# Patient Record
(demographics unavailable — no encounter records)

---

## 2024-10-07 NOTE — CONSULT LETTER
[Dear  ___] : Dear  [unfilled], [Courtesy Letter:] : I had the pleasure of seeing your patient, [unfilled], in my office today. [Sincerely,] : Sincerely, [DrJacob  ___] : Dr. SAGE

## 2024-10-08 NOTE — PAST MEDICAL HISTORY
[Menarche Age ____] : age at menarche was [unfilled] [Total Preg ___] : G[unfilled] [Live Births ___] : P[unfilled]  [Age At Live Birth ___] : Age at live birth: [unfilled] [FreeTextEntry7] : x 6 months [FreeTextEntry8] : 1 x 3 months

## 2024-10-08 NOTE — PHYSICAL EXAM
[No dominant masses] : no dominant masses in right breast  [EOMI] : extra ocular movement intact [Sclera nonicteric] : sclera nonicteric [Supple] : supple [No Supraclavicular Adenopathy] : no supraclavicular adenopathy [No Cervical Adenopathy] : no cervical adenopathy [No Thyromegaly] : no thyromegaly [Clear to Auscultation Bilat] : clear to auscultation bilaterally [Examined in the supine and seated position] : examined in the supine and seated position [No dominant masses] : no dominant masses left breast [No Nipple Retraction] : no left nipple retraction [No Nipple Discharge] : no left nipple discharge [No Axillary Lymphadenopathy] : no left axillary lymphadenopathy [Soft] : abdomen soft [Not Tender] : non-tender [de-identified] : Radial scar 9:00. Inframammary scar. Elliptical skin island inferior. [de-identified] : Low transverse abdominal scar. [de-identified] : Inframammary scar. several benign appearing skin lesion lateral left (keratoses, angioma).

## 2024-10-08 NOTE — HISTORY OF PRESENT ILLNESS
[FreeTextEntry1] : This is a 64 year old  female diagnosed with left breast DCIS in 1/2012.  She underwent bilateral nipple sparing mastectomies with implant reconstructions in 3/2012.  She had had pre-existing implants and did not require alloderm. She had the implants exchanged in June 2015. She did not have fat injections.  She underwent conversion to a EMILIA reconstruction in 9/2020.  She had to go back to the OR for a concern on the left doppler signal.  She then had an abdominal E.Coli wound infection requiring hospitalization and a wound vac.  She had a right leg DVT.  She then had a smaller MRSA infection at an abdominal wound suture site and took Bactrim for 20 days.  She decided not to have revision surgery, but did undergo steroid injections for the scars which did lead to improvement.   She was seen in March 2024 complaining of blisters at the right breast incision line.  She saw her Dermatologist and cultures were negative, including for shingles. A skin biopsy was performed in 4/2024 and showed a subepidermal blister with eosinophils. Her Dermatologist diagnosed it as bullous pemphigoid.  She has not had any other skin outbreaks, but has had some sores in her mouth.  She has no current complaints except for some slight itchiness of the lateral left breast skin.

## 2024-10-22 NOTE — ASSESSMENT
[FreeTextEntry1] :    I have reviewed the patient's medical records and diagnostic images at time of this office consultation and have made the following recommendation: 1.

## 2024-10-22 NOTE — HISTORY OF PRESENT ILLNESS
[FreeTextEntry1] : Ms. BREANN MARTÍNEZ, 64 year old female, former smoker, w/ hx of left breast DCIS in 01/2012, s/p bilateral nipple sparing mastectomies with implant reconstructions in 3/2012, implants exchanged in June 2015, s/p conversion to a EMILIA reconstruction in 9/2020, c/w abdominal E.Coli wound infection requiring hospitalization and a wound vac, right DVT, MRSA infection at an abdominal wound suture site, anxiety, Morrissey esophagus?, s/p umbilical hernia repair, HTN, ....., who referred by Dr. Francine Calvin (GI) for hiatal hernia.   EGD on 04/12/2024 for epigastric abdominal pain, reflux and Nausea. Nodular mucosa in the gastric fundus and in the gastric body. Nodularity and mossaic pattern mucosa in the gastric fundus. Small hiatal hernia.   Patient is here today for CT surgery consultation.

## 2024-10-22 NOTE — CONSULT LETTER
[Dear  ___] : Dear  [unfilled], [Consult Letter:] : I had the pleasure of evaluating your patient, [unfilled]. [Please see my note below.] : Please see my note below. [Consult Closing:] : Thank you very much for allowing me to participate in the care of this patient.  If you have any questions, please do not hesitate to contact me. [Sincerely,] : Sincerely, [FreeTextEntry2] : Dr. Francine Calvin (GI/Ref) [FreeTextEntry3] : Tee Banks MD, MPH  System Director of Thoracic Surgery  Director of Comprehensive Lung and Foregut [place]  Professor Cardiovascular & Thoracic Surgery  [facility] [employer] at [facility]

## 2024-10-31 NOTE — PHYSICAL EXAM
[General Appearance - Alert] : alert [General Appearance - In No Acute Distress] : in no acute distress [Sclera] : the sclera and conjunctiva were normal [PERRL With Normal Accommodation] : pupils were equal in size, round, and reactive to light [Extraocular Movements] : extraocular movements were intact [Outer Ear] : the ears and nose were normal in appearance [Oropharynx] : the oropharynx was normal [Neck Appearance] : the appearance of the neck was normal [Neck Cervical Mass (___cm)] : no neck mass was observed [Jugular Venous Distention Increased] : there was no jugular-venous distention [Thyroid Diffuse Enlargement] : the thyroid was not enlarged [Thyroid Nodule] : there were no palpable thyroid nodules [Auscultation Breath Sounds / Voice Sounds] : lungs were clear to auscultation bilaterally [Heart Rate And Rhythm] : heart rate was normal and rhythm regular [Heart Sounds] : normal S1 and S2 [Heart Sounds Gallop] : no gallops [Murmurs] : no murmurs [Heart Sounds Pericardial Friction Rub] : no pericardial rub [Examination Of The Chest] : the chest was normal in appearance [Chest Visual Inspection Thoracic Asymmetry] : no chest asymmetry [Diminished Respiratory Excursion] : normal chest expansion [2+] : left 2+ [Bowel Sounds] : normal bowel sounds [Abdomen Soft] : soft [Abdomen Tenderness] : non-tender [Abdomen Mass (___ Cm)] : no abdominal mass palpated [Cervical Lymph Nodes Enlarged Posterior Bilaterally] : posterior cervical [Cervical Lymph Nodes Enlarged Anterior Bilaterally] : anterior cervical [Supraclavicular Lymph Nodes Enlarged Bilaterally] : supraclavicular [No CVA Tenderness] : no ~M costovertebral angle tenderness [No Spinal Tenderness] : no spinal tenderness [Abnormal Walk] : normal gait [Nail Clubbing] : no clubbing  or cyanosis of the fingernails [Musculoskeletal - Swelling] : no joint swelling seen [Motor Tone] : muscle strength and tone were normal [Skin Color & Pigmentation] : normal skin color and pigmentation [Skin Turgor] : normal skin turgor [] : no rash

## 2024-11-01 NOTE — CONSULT LETTER
[FreeTextEntry2] : Dr. Francine Calvin (GI/Ref) [FreeTextEntry3] : Tee Banks MD, MPH  System Director of Thoracic Surgery  Director of Comprehensive Lung and Foregut [place]  Professor Cardiovascular & Thoracic Surgery  [facility] [employer] at [facility]

## 2024-11-01 NOTE — DATA REVIEWED
[FreeTextEntry1] : I have independently reviewed patient's EGD on 04/12/2024 and Upper GI series on 10/28/24

## 2024-11-01 NOTE — CONSULT LETTER
[FreeTextEntry2] : Dr. Francine Calvin (GI/Ref) [FreeTextEntry3] : Tee Banks MD, MPH  System Director of Thoracic Surgery  Director of Comprehensive Lung and Foregut [place]  Professor Cardiovascular & Thoracic Surgery  [facility] [employer] at [facility]

## 2024-11-01 NOTE — HISTORY OF PRESENT ILLNESS
[FreeTextEntry1] : Ms. BREANN MARTÍNEZ, 64 year old female, former smoker (1 ppd x 5 yrs, quit in 1981), w/ hx of HTN, HLD, left breast DCIS in 01/2012, s/p bilateral nipple sparing mastectomies with implant reconstructions in 3/2012, implants exchanged in June 2015, s/p conversion to a EMILIA reconstruction in 9/2020, c/w abdominal E. Coli wound infection requiring hospitalization and a wound vac, right DVT, MRSA infection at an abdominal wound suture site, anxiety, s/p umbilical hernia repair, who referred by Dr. Francine Calvin (GI) for hiatal hernia.   EGD on 04/12/2024 for epigastric abdominal pain, reflux and Nausea. Nodular mucosa in the gastric fundus and in the gastric body. Nodularity and mossaic pattern mucosa in the gastric fundus. Small hiatal hernia.   Upper GI series on 10/28/24: -  radiograph of the chest demonstrates the visualized lungs to be clear. Surgical clips are noted overlying the chest. - The patient drank barium without difficulty. There is normal esophageal distensibility and transit time and there is no stricture, mass or diverticulum. - There is small hiatal hernia. Gastroesophageal reflux to the upper esophagus was demonstrated during water siphonage.  Patient is here today for CT surgery consultation. Admits to acid reflux, on/off aspiration but denies dysphagia or regurgitation.

## 2024-11-01 NOTE — CONSULT LETTER
[FreeTextEntry2] : Dr. Francine Calvin (GI/Ref) [FreeTextEntry3] : Tee Banks MD, MPH  System Director of Thoracic Surgery  Director of Comprehensive Lung and Foregut [place]  Professor Cardiovascular & Thoracic Surgery  [facility] [employer] at [facility]

## 2024-11-01 NOTE — ASSESSMENT
[FreeTextEntry1] : Ms. BREANN MARTÍNEZ, 64 year old female, former smoker (1 ppd x 5 yrs, quit in 1981), w/ hx of HTN, HLD, left breast DCIS in 01/2012, s/p bilateral nipple sparing mastectomies with implant reconstructions in 3/2012, implants exchanged in June 2015, s/p conversion to a EMILIA reconstruction in 9/2020, c/w abdominal E. Coli wound infection requiring hospitalization and a wound vac, right DVT, MRSA infection at an abdominal wound suture site, anxiety, s/p umbilical hernia repair, who referred by Dr. Francine Calvin (GI) for hiatal hernia.   I have reviewed the patient's medical records and diagnostic images at time of this office consultation and have made the following recommendation: 1. EGD and upper GI study reviewed, showed hiatal hernia, patient is symptomatic failing medical therapy (using both Pepcid and prilosec with acid reflux still), with aspiration risks, I have discussed surgical intervention with patient. I recommended an EGD, Lap, R.A. repair of hiatal hernia, fundoplication on 1/6/25. Risks and benefits and alternatives explained to patient, all questions answered, patient agreed to proceed with surgery. 2. CT Chest w/o contrast 3. Manometry with Dr. Meeta Page 4. Medical clearance and PST   I, KEYA Sheth, personally performed the evaluation and management (E/M) services for this established patient who presents today with (a) new problem(s)/exacerbation of (an) existing condition(s). That E/M includes conducting the examination, assessing all new/exacerbated conditions, and establishing a new plan of care. Today, my ACP, Isabelle Goodrich NP was here to observe my evaluation and management services for this new problem/exacerbated condition to be followed going forward.

## 2024-12-05 NOTE — HISTORY OF PRESENT ILLNESS
Quality 431: Preventive Care And Screening: Unhealthy Alcohol Use - Screening: Patient screened for unhealthy alcohol use using a single question and scores less than 2 times per year
Detail Level: Detailed
[FreeTextEntry1] : 64 year old female with PMH of HTN, HLD, anxiety, left breast DCIS s/p bilateral mastectomy with implant reconstruction 2012/implant exchange 2015/conversion to EMILIA reconstruction 2020 c/b E.coli wound infection, wound vac, right DVT, and MRSA wound infection referred by Dr. Tee Banks for motility evaluation due to hiatal hernia and GERD. Patient reports a long history of reflux, recently worsening. Describes heartburn, bitter/sour taste, and nausea. Takes prilosec 40mg in the morning, famotidine 40mg in the evening, and tums frequently as needed. States antacids help but she reports breakthrough symptoms. Denies dysphagia, odynophagia, or regurgitation.  Underwent EGD 4/12/2024 with Dr. Francine Calvin which revealed small hiatal hernia, normal appearing esophagus, nodular mucosa in the gastric fundus and body, normal appearing duodenum. Pathology not available for my review.  Repeat EGD 10/2/2024 with Dr. Calvin showed medium sized hiatal hernia, mild chronic gastritis, esophageal mucosa changes suspicious for Morrissey's esophagus but pathology negative for Morrissey's. No intestinal metaplasia, dysplasia, or H. pylori.  Upper GI series on 10/28/24: normal esophageal distensibility and transit time and there is no stricture, mass or diverticulum. There is small hiatal hernia. Gastroesophageal reflux to the upper esophagus was demonstrated during water siphonage.  Family history significant for stomach cancer in patient's mother and maternal aunt. Patient is up to date on her colon cancer screening as she states she underwent colonoscopy with Dr. Francine Calvin in June of 2024.
Quality 130: Documentation Of Current Medications In The Medical Record: Current Medications Documented
Quality 226: Preventive Care And Screening: Tobacco Use: Screening And Cessation Intervention: Patient screened for tobacco use and is an ex/non-smoker

## 2024-12-05 NOTE — REASON FOR VISIT
[Home] : at home, [unfilled] , at the time of the visit. [Medical Office: ([facility])___] : at the medical office located in  [Patient] : the patient [Self] : self

## 2024-12-05 NOTE — ASSESSMENT
[FreeTextEntry1] : 64 year old female with PMH of HTN, HLD, anxiety, left breast DCIS s/p bilateral mastectomy with implant reconstruction 2012/implant exchange 2015/conversion to EMILIA reconstruction 2020 c/b E.coli wound infection, wound vac, right DVT, and MRSA wound infection referred by Dr. Tee Banks for motility evaluation due to hiatal hernia and GERD. Patient with long history of reflux symptoms, now worsening, despite PPI/H2 blocker use. Most recent EGD with medium sized hernia and mild chronic gastritis. Esophagram showed small HH and reflux to the upper esophagus. Reviewed Dr. Tee Banks's note and the available procedure reports Recommend esophageal manometry to assess esophageal motility prior to consideration of surgery.  Discussed Esophageal manometry procedure in detail with patient. The risks, benefits and alternatives of the procedure were explained. Risks includes nasal irritation, bleeding, coughing, sore throat and sinusitis. Patient advised that an escort is not required as no anesthesia is used in this procedure. Patient instructed to remain NPO for at least 8 hours prior to the test. Patient instructed to avoid taking any prokinetic agents, muscle relaxants, opiate pain medications, Valium, Xanax, Ativan,etc. Patient was made aware that the procedure is performed by an NP/PA and interpreted at a later date by the physician. All questions were answered. The patient expressed understanding of these risks and is agreeable to proceed. The  to confirm appointment with patient.

## 2025-02-19 NOTE — ASSESSMENT
[FreeTextEntry1] : Ms. BREANN MARTÍNEZ, 64 year old female, former smoker (1 ppd x 5 yrs, quit in 1981), w/ hx of HTN, HLD, left breast DCIS in 01/2012, s/p bilateral nipple sparing mastectomies with implant reconstructions in 3/2012, implants exchanged in June 2015, s/p conversion to a EMILIA reconstruction in 9/2020, c/w abdominal E. Coli wound infection requiring hospitalization and a wound vac, right DVT, MRSA infection at an abdominal wound suture site, anxiety, s/p umbilical hernia repair, who referred by Dr. Francine Calvin (GI) for hiatal hernia.   EGD on 04/12/2024 for epigastric abdominal pain, reflux and Nausea. Nodular mucosa in the gastric fundus and in the gastric body. Nodularity and mossaic pattern mucosa in the gastric fundus. Small hiatal hernia.  Upper GI series on 10/28/24: -  radiograph of the chest demonstrates the visualized lungs to be clear. Surgical clips are noted overlying the chest. - The patient drank barium without difficulty. There is normal esophageal distensibility and transit time and there is no stricture, mass or diverticulum. - There is small hiatal hernia. Gastroesophageal reflux to the upper esophagus was demonstrated during water siphonage.  CT chest done on 12/09/2024. - Trace pericardial effusion noted - S/P bilateral mastectomies with construction - Elliptical-shaped isodensities are present in paraspinal location bilaterally at mult. levels. Largest noted on Lt & measures 1.4 x 0.6cm.  - Few visualized isodensities on CT scan of abdomen on 9/15/2020 are unchanged  Now s/p ~2 weeks EGD, Laparoscopy RA, hiatal hernia repair, & Toupet fundoplication. gastric fat pad and hernia sac were dissected free, sent to Pathology. Path revealed ____.   CXRAY reviewed.   Patient presents today for follow-up s/p procedure.   I have reviewed the patient's medical records and diagnostic images at time of this office consultation and have made the following recommendation: 1.

## 2025-02-19 NOTE — REASON FOR VISIT
[de-identified] : EGD, Laparoscopy RA, hiatal hernia repair, & Toupet fundoplication [de-identified] : 02/12/2025

## 2025-02-19 NOTE — CONSULT LETTER
[Dear  ___] : Dear  [unfilled], [Consult Letter:] : I had the pleasure of evaluating your patient, [unfilled]. [Please see my note below.] : Please see my note below. [Consult Closing:] : Thank you very much for allowing me to participate in the care of this patient.  If you have any questions, please do not hesitate to contact me. [Sincerely,] : Sincerely, [FreeTextEntry2] : Dr. Francine Calvin (GI/Ref) [FreeTextEntry3] : Tee Banks MD, MPH  System Director of Thoracic Surgery  Director of Comprehensive Lung and Foregut [place]  Professor Cardiovascular & Thoracic Surgery  [facility] [employer] at [facility]

## 2025-02-27 NOTE — CONSULT LETTER
[FreeTextEntry2] : Dr. Francine Calvin (GI/Ref) [FreeTextEntry3] : Tee Banks MD, MPH  System Director of Thoracic Surgery  Director of Comprehensive Lung and Foregut [place]  Professor Cardiovascular & Thoracic Surgery  [facility] [employer] at [facility]

## 2025-02-27 NOTE — ASSESSMENT
[FreeTextEntry1] : Ms. BREANN MARTÍNEZ, 64 year old female, former smoker (1 ppd x 5 yrs, quit in 1981), w/ hx of HTN, HLD, left breast DCIS in 01/2012, s/p bilateral nipple sparing mastectomies with implant reconstructions in 3/2012, implants exchanged in June 2015, s/p conversion to a EMILIA reconstruction in 9/2020, c/w abdominal E. Coli wound infection requiring hospitalization and a wound vac, right DVT, MRSA infection at an abdominal wound suture site, anxiety, s/p umbilical hernia repair, who referred by Dr. Francine Calvin (GI) for hiatal hernia.   EGD on 04/12/2024 for epigastric abdominal pain, reflux and Nausea. Nodular mucosa in the gastric fundus and in the gastric body. Nodularity and mossaic pattern mucosa in the gastric fundus. Small hiatal hernia.  Upper GI series on 10/28/24: -  radiograph of the chest demonstrates the visualized lungs to be clear. Surgical clips are noted overlying the chest. - The patient drank barium without difficulty. There is normal esophageal distensibility and transit time and there is no stricture, mass or diverticulum. - There is small hiatal hernia. Gastroesophageal reflux to the upper esophagus was demonstrated during water siphonage.  CT chest done on 12/09/2024. - Trace pericardial effusion noted - S/P bilateral mastectomies with construction - Elliptical-shaped isodensities are present in paraspinal location bilaterally at mult. levels. Largest noted on Lt & measures 1.4 x 0.6cm.  - Few visualized isodensities on CT scan of abdomen on 9/15/2020 are unchanged  Manometry on 12/10/24: - normal with HH of 3 cm  Now s/p EGD, Laparoscopy RA, hiatal hernia repair, Toupet fundoplication on 2/12/25. Path revealed mature fibroadipose tissue and two lymph nodes with no significant histopathologic findings.  CXR done 2/25/25 revealed no acute disease.  She is recovering well, tolerating soft diet now. Denies fever, chills, N/V/C/D, abd pain, acid reflux.   I have reviewed the patient's medical records and diagnostic images at time of this office consultation and have made the following recommendation: 1. Clinically doing well after surgery. Continue soft diet for 3-4 days, can advance to regular diet afterwards.  2. RTC in 6 months with esophagram.    I, KEYA Sheth, personally performed the evaluation and management (E/M) services for this established patient who follow up today with an existing condition.  That E/M includes conducting the examination, assessing all new/exacerbated/existing conditions, and establishing a plan of care. Today, my ACP, Dianelys Delgado NP, was here to observe my evaluation and management services for this existing condition to be followed going forward.

## 2025-02-27 NOTE — REASON FOR VISIT
Care Management Progress Note:  CM provided family with SNF choice list, family to review and make decision, will update CM in am. Per family they will remain in the [place] area during rehab stay (patient home is in [place], [place]). CM following for needs. [de-identified] : EGD, Laparoscopy RA, hiatal hernia repair, & Toupet fundoplication [de-identified] : 02/12/2025

## 2025-02-27 NOTE — DISCUSSION/SUMMARY
[Doing Well] : is doing well [Excellent Pain Control] : has excellent pain control [No Sign of Infection] : is showing no signs of infection [Dressing Change] : dressing was changed [Clinical Recheck] : clinical recheck

## 2025-03-04 NOTE — REVIEW OF SYSTEMS
[Fever] : no fever [Chills] : no chills [Earache] : no earache [Nasal Discharge] : no nasal discharge [Sore Throat] : no sore throat [Chest Pain] : no chest pain [Palpitations] : no palpitations [Wheezing] : no wheezing [Cough] : no cough [Nausea] : no nausea [Vomiting] : no vomiting [Dysuria] : no dysuria [Headache] : no headache [Dizziness] : no dizziness [FreeTextEntry2] : COVID end of January [FreeTextEntry7] : reflux is better; more regular with linzess [FreeTextEntry8] : has been hydrating [de-identified] : Mood doing well-8-9 hours of sleep per night; melatonin helping

## 2025-03-04 NOTE — HISTORY OF PRESENT ILLNESS
[FreeTextEntry1] : Ms. MARTÍNEZ presents for annual physical. [de-identified] : Ms. MARTÍNEZ presents for annual physical.  Just had Laproscopic Hiatal Hernia Repair-Dr. Tee Banks; just did follow-up.  On Linzess now- was treated for SIBO; started end of December Doing better with Linzess  Drinking Warm beverages Reflux is much better since procedure.   Sees Neurology-Dr. Ellison every 6 months   Sees Breast Surgeon annually  2024 last colonoscopy Saw GYN August 2024-Dr. Rivero  Gastro Dr. MARKEL Calvin-MRI showed fatty liver will be following up.  Derm every 6 months

## 2025-03-04 NOTE — HEALTH RISK ASSESSMENT
[No] : No [Former] : Former [# of Members in Household ___] :  household currently consist of [unfilled] member(s) [Retired] : retired [] :  [# Of Children ___] : has [unfilled] children [de-identified] : Walking 4 days a week about a half hour [de-identified] : stopped in 20s  [MammogramComments] : follow up [PapSmearDate] : 08/24 [ColonoscopyDate] : 2024 [de-identified] : Reading and distance-eyeglasses-eye exam January 2025 [de-identified] : Dentist tomorrow

## 2025-03-04 NOTE — PHYSICAL EXAM
[No Acute Distress] : no acute distress [Well-Appearing] : well-appearing [Normal Sclera/Conjunctiva] : normal sclera/conjunctiva [PERRL] : pupils equal round and reactive to light [Normal Oropharynx] : the oropharynx was normal [Normal TMs] : both tympanic membranes were normal [No Lymphadenopathy] : no lymphadenopathy [Thyroid Normal, No Nodules] : the thyroid was normal and there were no nodules present [No Edema] : there was no peripheral edema [No Extremity Clubbing/Cyanosis] : no extremity clubbing/cyanosis [Normal] : affect was normal and insight and judgment were intact

## 2025-05-28 NOTE — ASSESSMENT
[FreeTextEntry1] : Ms. BREANN MARTÍNEZ, 65 year old female, former smoker (1 ppd x 5 yrs, quit in 1981), w/ hx of HTN, HLD, left breast DCIS in 01/2012, s/p bilateral nipple sparing mastectomies with implant reconstructions in 3/2012, implants exchanged in June 2015, s/p conversion to a EMILIA reconstruction in 9/2020, c/w abdominal E. Coli wound infection requiring hospitalization and a wound vac, right DVT, MRSA infection at an abdominal wound suture site, anxiety, s/p umbilical hernia repair, who referred by Dr. Francine Calvin (GI) for hiatal hernia.  EGD on 04/12/2024 for epigastric abdominal pain, reflux and Nausea. Nodular mucosa in the gastric fundus and in the gastric body. Nodularity and mossaic pattern mucosa in the gastric fundus. Small hiatal hernia.  Now s/p EGD, Laparoscopy RA, hiatal hernia repair, Toupet fundoplication on 2/12/25. Path revealed mature fibroadipose tissue and two lymph nodes with no significant histopathologic findings. She is recovering well, tolerating soft diet now. Denies fever, chills, N/V/C/D, abd pain, acid reflux.  esophagram on 5/29/25:   I have reviewed the patient's medical records and diagnostic images at time of this office consultation and have made the following recommendation: 1.

## 2025-05-28 NOTE — DATA REVIEWED
[FreeTextEntry1] : I have independently reviewed the following: esophagram on 5/29/25 [Normal] : no acute distress, well nourished, well developed and well-appearing [No JVD] : no jugular venous distention [No Respiratory Distress] : no respiratory distress  [Normal Outer Ear/Nose] : the outer ears and nose were normal in appearance [No Accessory Muscle Use] : no accessory muscle use

## 2025-05-28 NOTE — HISTORY OF PRESENT ILLNESS
[FreeTextEntry1] : Ms. BREANN MARTÍNEZ, 65 year old female, former smoker (1 ppd x 5 yrs, quit in 1981), w/ hx of HTN, HLD, left breast DCIS in 01/2012, s/p bilateral nipple sparing mastectomies with implant reconstructions in 3/2012, implants exchanged in June 2015, s/p conversion to a EMILIA reconstruction in 9/2020, c/w abdominal E. Coli wound infection requiring hospitalization and a wound vac, right DVT, MRSA infection at an abdominal wound suture site, anxiety, s/p umbilical hernia repair, who referred by Dr. Francine Calvin (GI) for hiatal hernia.  EGD on 04/12/2024 for epigastric abdominal pain, reflux and Nausea. Nodular mucosa in the gastric fundus and in the gastric body. Nodularity and mossaic pattern mucosa in the gastric fundus. Small hiatal hernia.  Upper GI series on 10/28/24: -  radiograph of the chest demonstrates the visualized lungs to be clear. Surgical clips are noted overlying the chest. - The patient drank barium without difficulty. There is normal esophageal distensibility and transit time and there is no stricture, mass or diverticulum. - There is small hiatal hernia. Gastroesophageal reflux to the upper esophagus was demonstrated during water siphonage.  CT chest done on 12/09/2024. - Trace pericardial effusion noted - S/P bilateral mastectomies with construction - Elliptical-shaped isodensities are present in paraspinal location bilaterally at mult. levels. Largest noted on Lt & measures 1.4 x 0.6cm. - Few visualized isodensities on CT scan of abdomen on 9/15/2020 are unchanged  Manometry on 12/10/24: - normal with HH of 3 cm  Now s/p EGD, Laparoscopy RA, hiatal hernia repair, Toupet fundoplication on 2/12/25. Path revealed mature fibroadipose tissue and two lymph nodes with no significant histopathologic findings.  CXR done 2/25/25 revealed no acute disease. She is recovering well, tolerating soft diet now. Denies fever, chills, N/V/C/D, abd pain, acid reflux.  esophagram on 5/29/25:   Pt presents today for follow up.

## 2025-05-30 NOTE — HISTORY OF PRESENT ILLNESS
[FreeTextEntry1] : Ms. BREANN MARTÍNEZ, 65 year old female, former smoker (1 ppd x 5 yrs, quit in 1981), w/ hx of HTN, HLD, left breast DCIS in 01/2012, s/p bilateral nipple sparing mastectomies with implant reconstructions in 3/2012, implants exchanged in June 2015, s/p conversion to a EMILIA reconstruction in 9/2020, c/w abdominal E. Coli wound infection requiring hospitalization and a wound vac, right DVT, MRSA infection at an abdominal wound suture site, anxiety, s/p umbilical hernia repair, who referred by Dr. Francine Calvin (GI) for hiatal hernia.  EGD on 04/12/2024 for epigastric abdominal pain, reflux and Nausea. Nodular mucosa in the gastric fundus and in the gastric body. Nodularity and mossaic pattern mucosa in the gastric fundus. Small hiatal hernia.  Upper GI series on 10/28/24: -  radiograph of the chest demonstrates the visualized lungs to be clear. Surgical clips are noted overlying the chest. - The patient drank barium without difficulty. There is normal esophageal distensibility and transit time and there is no stricture, mass or diverticulum. - There is small hiatal hernia. Gastroesophageal reflux to the upper esophagus was demonstrated during water siphonage.  CT chest done on 12/09/2024. - Trace pericardial effusion noted - S/P bilateral mastectomies with construction - Elliptical-shaped isodensities are present in paraspinal location bilaterally at mult. levels. Largest noted on Lt & measures 1.4 x 0.6cm. - Few visualized isodensities on CT scan of abdomen on 9/15/2020 are unchanged  Manometry on 12/10/24: - normal with HH of 3 cm  Now 3 mons s/p EGD, Laparoscopy RA, hiatal hernia repair, Toupet fundoplication on 2/12/25. Path revealed mature fibroadipose tissue and two lymph nodes with no significant histopathologic findings.  Pt called, had 4-5 episodes of food stuck, and regurgitation. Pt was feeling chocking. Pt is on 3 times daily with regular diet. Liquid is fine. Pt also c/o acid reflux. Advised pt to do barium swallow test and RTC to see Dr. Banks.   Barium esophagram on 5/29/25:  - mild gastroesophageal reflux was noted. - Fundoplication wrap in good position. - no esophageal stricture noted.  Pt presents today for follow up.

## 2025-05-30 NOTE — CONSULT LETTER
[Dear  ___] : Dear  [unfilled], [Consult Letter:] : I had the pleasure of evaluating your patient, [unfilled]. [Please see my note below.] : Please see my note below. [Consult Closing:] : Thank you very much for allowing me to participate in the care of this patient.  If you have any questions, please do not hesitate to contact me. [Sincerely,] : Sincerely, [FreeTextEntry2] : Dr. Francine Calvin (GI/Ref) [FreeTextEntry3] : Tee Banks MD, MPH  System Director of Thoracic Surgery  Director of Comprehensive Lung and Foregut [place]  Professor Cardiovascular & Thoracic Surgery  [facility] [employer] at [facility]

## 2025-05-30 NOTE — ASSESSMENT
[FreeTextEntry1] : Ms. BREANN MARTÍNEZ, 65 year old female, former smoker (1 ppd x 5 yrs, quit in 1981), w/ hx of HTN, HLD, left breast DCIS in 01/2012, s/p bilateral nipple sparing mastectomies with implant reconstructions in 3/2012, implants exchanged in June 2015, s/p conversion to a EMILIA reconstruction in 9/2020, c/w abdominal E. Coli wound infection requiring hospitalization and a wound vac, right DVT, MRSA infection at an abdominal wound suture site, anxiety, s/p umbilical hernia repair, who referred by Dr. Francine Calvin (GI) for hiatal hernia.  EGD on 04/12/2024 for epigastric abdominal pain, reflux and Nausea. Nodular mucosa in the gastric fundus and in the gastric body. Nodularity and mossaic pattern mucosa in the gastric fundus. Small hiatal hernia.  Now s/p EGD, Laparoscopy RA, hiatal hernia repair, Toupet fundoplication on 2/12/25. Path revealed mature fibroadipose tissue and two lymph nodes with no significant histopathologic findings. She is recovering well, tolerating soft diet now. Denies fever, chills, N/V/C/D, abd pain, acid reflux.  Barium esophagram on 5/29/25:  - mild gastroesophageal reflux was noted. - Fundoplication wrap in good position. - no esophageal stricture noted.  I have reviewed the patient's medical records and diagnostic images at time of this office consultation and have made the following recommendation: 1.

## 2025-07-27 NOTE — HISTORY OF PRESENT ILLNESS
[FreeTextEntry1] : Ms. BREANN MARTÍNEZ, 65 year old female, former smoker (1 ppd x 5 yrs, quit in 1981), w/ hx of HTN, HLD, left breast DCIS in 01/2012, s/p bilateral nipple sparing mastectomies with implant reconstructions in 3/2012, implants exchanged in June 2015, s/p conversion to a EMILIA reconstruction in 9/2020, c/w abdominal E. Coli wound infection requiring hospitalization and a wound vac, right DVT, MRSA infection at an abdominal wound suture site, anxiety, s/p umbilical hernia repair, who referred by Dr. Francine Calvin (GI) for hiatal hernia.  EGD on 04/12/2024 for epigastric abdominal pain, reflux and Nausea. Nodular mucosa in the gastric fundus and in the gastric body. Nodularity and mossaic pattern mucosa in the gastric fundus. Small hiatal hernia.  Upper GI series on 10/28/24: -  radiograph of the chest demonstrates the visualized lungs to be clear. Surgical clips are noted overlying the chest. - The patient drank barium without difficulty. There is normal esophageal distensibility and transit time and there is no stricture, mass or diverticulum. - There is small hiatal hernia. Gastroesophageal reflux to the upper esophagus was demonstrated during water siphonage.  CT chest done on 12/09/2024. - Trace pericardial effusion noted - S/P bilateral mastectomies with construction - Elliptical-shaped isodensities are present in paraspinal location bilaterally at mult. levels. Largest noted on Lt & measures 1.4 x 0.6cm. - Few visualized isodensities on CT scan of abdomen on 9/15/2020 are unchanged  Manometry on 12/10/24: - normal with HH of 3 cm  Now s/p EGD, Laparoscopy RA, hiatal hernia repair, Toupet fundoplication on 2/12/25. Path revealed mature fibroadipose tissue and two lymph nodes with no significant histopathologic findings.  Pt called in 05/2025, had 4-5 episodes of food stuck, and regurgitation. Pt was feeling chocking. Pt is on 3 times daily with regular diet. Liquid is fine. Pt also c/o acid reflux. Advised pt to do barium swallow test and RTC to see Dr. Banks.   Barium esophagram on 5/29/25:  - mild gastroesophageal reflux was noted. - Fundoplication wrap in good position. - no esophageal stricture noted.  Pt presents today for follow up.

## 2025-07-27 NOTE — CONSULT LETTER
[FreeTextEntry2] : Dr. Francine Calvin (GI/Ref) [FreeTextEntry3] : Tee Banks MD, MPH  System Director of Thoracic Surgery  Director of Comprehensive Lung and Foregut [place]  Professor Cardiovascular & Thoracic Surgery  [facility] [employer] at [facility]

## 2025-07-27 NOTE — CONSULT LETTER
[FreeTextEntry2] : Dr. Francine Calvin (GI/Ref) [FreeTextEntry3] : Tee Banks MD, MPH  System Director of Thoracic Surgery  Director of Comprehensive Lung and Foregut [place]  Professor Cardiovascular & Thoracic Surgery  [facility] [employer] at [facility]